# Patient Record
Sex: MALE | Race: BLACK OR AFRICAN AMERICAN | Employment: FULL TIME | URBAN - NONMETROPOLITAN AREA
[De-identification: names, ages, dates, MRNs, and addresses within clinical notes are randomized per-mention and may not be internally consistent; named-entity substitution may affect disease eponyms.]

---

## 2024-02-02 ENCOUNTER — OFFICE VISIT (OUTPATIENT)
Dept: URGENT CARE | Facility: CLINIC | Age: 24
End: 2024-02-02
Payer: COMMERCIAL

## 2024-02-02 VITALS
BODY MASS INDEX: 26.77 KG/M2 | DIASTOLIC BLOOD PRESSURE: 68 MMHG | SYSTOLIC BLOOD PRESSURE: 128 MMHG | OXYGEN SATURATION: 98 % | HEIGHT: 73 IN | RESPIRATION RATE: 16 BRPM | HEART RATE: 87 BPM | WEIGHT: 202 LBS

## 2024-02-02 DIAGNOSIS — Z02.4 DRIVER'S PERMIT PHYSICAL EXAMINATION: Primary | ICD-10-CM

## 2024-02-02 NOTE — PROGRESS NOTES
[facility]        NAME: Aubrey Davenport is a 23 y.o. male  : 2000    MRN: [id number]  DATE: 2024  TIME: 10:23 AM    Assessment and Plan   's permit physical examination [Z02.4]  1. 's permit physical examination            Physical exam unremarkable.  Vitals normal.  Medical history without any chronic conditions or limiting medical conditions.  No history of substance abuse disorder.  Passed vision screen.  Qualified for 's permit without restrictions.      Chief Complaint     Chief Complaint   Patient presents with    Annual Exam     's Learner's Permit         History of Present Illness       HPI    Patient presents today for 's physical exam.    Reports no chronic medications or over-the-counter medications.  Denies any chronic medical conditions including neurologic disorders, uncontrolled epilepsy, neuropsychiatric disorders, uncontrolled diabetes, circulatory disorder, cognitive impairment, cardiac disorder, hypertension, alcohol abuse, drug abuse.    Denies any chest pain, dyspnea, abdominal pain, nausea, vomiting, headache, visual changes, lightheadedness, weakness, numbness.    Review of Systems   Review of Systems   Constitutional:  Negative for chills and fever.   HENT:  Negative for ear pain and sore throat.    Eyes:  Negative for pain and visual disturbance.   Respiratory:  Negative for cough and shortness of breath.    Cardiovascular:  Negative for chest pain and palpitations.   Gastrointestinal:  Negative for abdominal pain and vomiting.   Genitourinary:  Negative for dysuria and hematuria.   Musculoskeletal:  Negative for arthralgias and back pain.   Skin:  Negative for color change and rash.   Neurological:  Negative for seizures and syncope.   All other systems reviewed and are negative.        Current Medications     No current outpatient medications on file.    Current Allergies     Allergies as of 2024    (No Known  "Allergies)            The following portions of the patient's history were reviewed and updated as appropriate: allergies, current medications, past family history, past medical history, past social history, past surgical history and problem list.     History reviewed. No pertinent past medical history.    History reviewed. No pertinent surgical history.    History reviewed. No pertinent family history.      Medications have been verified.        Objective   /68   Pulse 87   Resp 16   Ht 6' 1\" (1.854 m)   Wt 91.6 kg (202 lb)   SpO2 98%   BMI 26.65 kg/m²        Physical Exam     Horizontal vision range: >/=110 degrees bilaterally  Physical Exam  Constitutional:       Appearance: Normal appearance.   HENT:      Head: Normocephalic and atraumatic.      Right Ear: Tympanic membrane and ear canal normal.      Left Ear: Tympanic membrane and ear canal normal.      Nose: Nose normal.      Mouth/Throat:      Mouth: Mucous membranes are moist.      Pharynx: Oropharynx is clear. No posterior oropharyngeal erythema.   Eyes:      General: No scleral icterus.  Cardiovascular:      Rate and Rhythm: Normal rate.   Pulmonary:      Effort: Pulmonary effort is normal. No respiratory distress.   Musculoskeletal:      Cervical back: Normal range of motion. Normal range of motion.      Thoracic back: Normal range of motion. No scoliosis.      Lumbar back: Normal range of motion. No scoliosis.   Neurological:      General: No focal deficit present.      Mental Status: He is alert and oriented to person, place, and time.   Psychiatric:         Mood and Affect: Mood normal.         Behavior: Behavior normal.                   "